# Patient Record
Sex: FEMALE | Race: WHITE | NOT HISPANIC OR LATINO
[De-identification: names, ages, dates, MRNs, and addresses within clinical notes are randomized per-mention and may not be internally consistent; named-entity substitution may affect disease eponyms.]

---

## 2017-02-21 ENCOUNTER — ASOB RESULT (OUTPATIENT)
Age: 27
End: 2017-02-21

## 2017-02-21 ENCOUNTER — APPOINTMENT (OUTPATIENT)
Dept: ANTEPARTUM | Facility: CLINIC | Age: 27
End: 2017-02-21

## 2017-07-04 ENCOUNTER — OUTPATIENT (OUTPATIENT)
Dept: OUTPATIENT SERVICES | Facility: HOSPITAL | Age: 27
LOS: 1 days | End: 2017-07-04
Payer: COMMERCIAL

## 2017-07-04 DIAGNOSIS — Z3A.00 WEEKS OF GESTATION OF PREGNANCY NOT SPECIFIED: ICD-10-CM

## 2017-07-04 DIAGNOSIS — O26.899 OTHER SPECIFIED PREGNANCY RELATED CONDITIONS, UNSPECIFIED TRIMESTER: ICD-10-CM

## 2017-07-04 PROCEDURE — G0463: CPT

## 2017-07-04 PROCEDURE — 59025 FETAL NON-STRESS TEST: CPT | Mod: 26

## 2017-07-04 PROCEDURE — 59025 FETAL NON-STRESS TEST: CPT

## 2017-07-05 ENCOUNTER — RESULT REVIEW (OUTPATIENT)
Age: 27
End: 2017-07-05

## 2017-07-05 ENCOUNTER — INPATIENT (INPATIENT)
Facility: HOSPITAL | Age: 27
LOS: 3 days | Discharge: HOME | End: 2017-07-09
Attending: OBSTETRICS & GYNECOLOGY | Admitting: OBSTETRICS & GYNECOLOGY
Payer: COMMERCIAL

## 2017-07-05 VITALS — HEIGHT: 63 IN | WEIGHT: 158.73 LBS

## 2017-07-05 DIAGNOSIS — O26.899 OTHER SPECIFIED PREGNANCY RELATED CONDITIONS, UNSPECIFIED TRIMESTER: ICD-10-CM

## 2017-07-05 DIAGNOSIS — Z3A.00 WEEKS OF GESTATION OF PREGNANCY NOT SPECIFIED: ICD-10-CM

## 2017-07-05 DIAGNOSIS — Z34.80 ENCOUNTER FOR SUPERVISION OF OTHER NORMAL PREGNANCY, UNSPECIFIED TRIMESTER: ICD-10-CM

## 2017-07-05 LAB
AMNISURE ROM (RUPTURE OF MEMBRANES): POSITIVE
BASOPHILS # BLD AUTO: 0 K/UL — SIGNIFICANT CHANGE UP (ref 0–0.2)
BASOPHILS NFR BLD AUTO: 0.3 % — SIGNIFICANT CHANGE UP (ref 0–2)
BLD GP AB SCN SERPL QL: NEGATIVE — SIGNIFICANT CHANGE UP
EOSINOPHIL # BLD AUTO: 0.1 K/UL — SIGNIFICANT CHANGE UP (ref 0–0.5)
EOSINOPHIL NFR BLD AUTO: 0.8 % — SIGNIFICANT CHANGE UP (ref 0–6)
HCT VFR BLD CALC: 38.4 % — SIGNIFICANT CHANGE UP (ref 34.5–45)
HGB BLD-MCNC: 13.3 G/DL — SIGNIFICANT CHANGE UP (ref 11.5–15.5)
LYMPHOCYTES # BLD AUTO: 2.6 K/UL — SIGNIFICANT CHANGE UP (ref 1–3.3)
LYMPHOCYTES # BLD AUTO: 23.1 % — SIGNIFICANT CHANGE UP (ref 13–44)
MCHC RBC-ENTMCNC: 31.6 PG — SIGNIFICANT CHANGE UP (ref 27–34)
MCHC RBC-ENTMCNC: 34.8 GM/DL — SIGNIFICANT CHANGE UP (ref 32–36)
MCV RBC AUTO: 90.8 FL — SIGNIFICANT CHANGE UP (ref 80–100)
MONOCYTES # BLD AUTO: 0.6 K/UL — SIGNIFICANT CHANGE UP (ref 0–0.9)
MONOCYTES NFR BLD AUTO: 5.5 % — SIGNIFICANT CHANGE UP (ref 2–14)
NEUTROPHILS # BLD AUTO: 7.8 K/UL — HIGH (ref 1.8–7.4)
NEUTROPHILS NFR BLD AUTO: 70.3 % — SIGNIFICANT CHANGE UP (ref 43–77)
PLATELET # BLD AUTO: 308 K/UL — SIGNIFICANT CHANGE UP (ref 150–400)
RBC # BLD: 4.23 M/UL — SIGNIFICANT CHANGE UP (ref 3.8–5.2)
RBC # FLD: 12.5 % — SIGNIFICANT CHANGE UP (ref 10.3–14.5)
RH IG SCN BLD-IMP: POSITIVE — SIGNIFICANT CHANGE UP
RH IG SCN BLD-IMP: POSITIVE — SIGNIFICANT CHANGE UP
WBC # BLD: 11 K/UL — HIGH (ref 3.8–10.5)
WBC # FLD AUTO: 11 K/UL — HIGH (ref 3.8–10.5)

## 2017-07-05 RX ORDER — OXYTOCIN 10 UNIT/ML
4 VIAL (ML) INJECTION
Qty: 30 | Refills: 0 | Status: DISCONTINUED | OUTPATIENT
Start: 2017-07-05 | End: 2017-07-05

## 2017-07-05 RX ORDER — SODIUM CHLORIDE 9 MG/ML
1000 INJECTION, SOLUTION INTRAVENOUS ONCE
Qty: 0 | Refills: 0 | Status: COMPLETED | OUTPATIENT
Start: 2017-07-05 | End: 2017-07-05

## 2017-07-05 RX ORDER — CITRIC ACID/SODIUM CITRATE 300-500 MG
15 SOLUTION, ORAL ORAL EVERY 4 HOURS
Qty: 0 | Refills: 0 | Status: DISCONTINUED | OUTPATIENT
Start: 2017-07-05 | End: 2017-07-06

## 2017-07-05 RX ORDER — OXYTOCIN 10 UNIT/ML
4 VIAL (ML) INJECTION
Qty: 30 | Refills: 0 | Status: DISCONTINUED | OUTPATIENT
Start: 2017-07-05 | End: 2017-07-09

## 2017-07-05 RX ORDER — SODIUM CHLORIDE 9 MG/ML
1000 INJECTION, SOLUTION INTRAVENOUS
Qty: 0 | Refills: 0 | Status: DISCONTINUED | OUTPATIENT
Start: 2017-07-05 | End: 2017-07-06

## 2017-07-05 RX ORDER — OXYTOCIN 10 UNIT/ML
333.33 VIAL (ML) INJECTION
Qty: 20 | Refills: 0 | Status: DISCONTINUED | OUTPATIENT
Start: 2017-07-05 | End: 2017-07-06

## 2017-07-05 RX ADMIN — Medication 15 MILLILITER(S): at 18:38

## 2017-07-05 RX ADMIN — Medication 4 MILLIUNIT(S)/MIN: at 18:34

## 2017-07-05 RX ADMIN — SODIUM CHLORIDE 125 MILLILITER(S): 9 INJECTION, SOLUTION INTRAVENOUS at 18:34

## 2017-07-05 RX ADMIN — SODIUM CHLORIDE 2000 MILLILITER(S): 9 INJECTION, SOLUTION INTRAVENOUS at 17:15

## 2017-07-06 ENCOUNTER — TRANSCRIPTION ENCOUNTER (OUTPATIENT)
Age: 27
End: 2017-07-06

## 2017-07-06 LAB — T PALLIDUM AB TITR SER: NEGATIVE — SIGNIFICANT CHANGE UP

## 2017-07-06 PROCEDURE — 88307 TISSUE EXAM BY PATHOLOGIST: CPT | Mod: 26

## 2017-07-06 RX ORDER — ONDANSETRON 8 MG/1
4 TABLET, FILM COATED ORAL EVERY 6 HOURS
Qty: 0 | Refills: 0 | Status: DISCONTINUED | OUTPATIENT
Start: 2017-07-06 | End: 2017-07-07

## 2017-07-06 RX ORDER — SODIUM CHLORIDE 9 MG/ML
1000 INJECTION, SOLUTION INTRAVENOUS
Qty: 0 | Refills: 0 | Status: DISCONTINUED | OUTPATIENT
Start: 2017-07-06 | End: 2017-07-09

## 2017-07-06 RX ORDER — DOCUSATE SODIUM 100 MG
100 CAPSULE ORAL
Qty: 0 | Refills: 0 | Status: DISCONTINUED | OUTPATIENT
Start: 2017-07-06 | End: 2017-07-09

## 2017-07-06 RX ORDER — DIPHENHYDRAMINE HCL 50 MG
25 CAPSULE ORAL EVERY 6 HOURS
Qty: 0 | Refills: 0 | Status: DISCONTINUED | OUTPATIENT
Start: 2017-07-06 | End: 2017-07-09

## 2017-07-06 RX ORDER — KETOROLAC TROMETHAMINE 30 MG/ML
30 SYRINGE (ML) INJECTION EVERY 6 HOURS
Qty: 0 | Refills: 0 | Status: DISCONTINUED | OUTPATIENT
Start: 2017-07-06 | End: 2017-07-09

## 2017-07-06 RX ORDER — DIPHENHYDRAMINE HCL 50 MG
25 CAPSULE ORAL EVERY 4 HOURS
Qty: 0 | Refills: 0 | Status: DISCONTINUED | OUTPATIENT
Start: 2017-07-06 | End: 2017-07-07

## 2017-07-06 RX ORDER — FENTANYL CITRATE 50 UG/ML
250 INJECTION INTRAVENOUS
Qty: 0 | Refills: 0 | Status: DISCONTINUED | OUTPATIENT
Start: 2017-07-06 | End: 2017-07-07

## 2017-07-06 RX ORDER — OXYCODONE HYDROCHLORIDE 5 MG/1
5 TABLET ORAL EVERY 4 HOURS
Qty: 0 | Refills: 0 | Status: DISCONTINUED | OUTPATIENT
Start: 2017-07-06 | End: 2017-07-09

## 2017-07-06 RX ORDER — FAMOTIDINE 10 MG/ML
20 INJECTION INTRAVENOUS ONCE
Qty: 0 | Refills: 0 | Status: DISCONTINUED | OUTPATIENT
Start: 2017-07-06 | End: 2017-07-09

## 2017-07-06 RX ORDER — OXYTOCIN 10 UNIT/ML
41.67 VIAL (ML) INJECTION
Qty: 20 | Refills: 0 | Status: DISCONTINUED | OUTPATIENT
Start: 2017-07-06 | End: 2017-07-06

## 2017-07-06 RX ORDER — OXYCODONE HYDROCHLORIDE 5 MG/1
5 TABLET ORAL
Qty: 0 | Refills: 0 | Status: DISCONTINUED | OUTPATIENT
Start: 2017-07-06 | End: 2017-07-09

## 2017-07-06 RX ORDER — IBUPROFEN 200 MG
600 TABLET ORAL EVERY 6 HOURS
Qty: 0 | Refills: 0 | Status: COMPLETED | OUTPATIENT
Start: 2017-07-06 | End: 2018-06-04

## 2017-07-06 RX ORDER — TETANUS TOXOID, REDUCED DIPHTHERIA TOXOID AND ACELLULAR PERTUSSIS VACCINE, ADSORBED 5; 2.5; 8; 8; 2.5 [IU]/.5ML; [IU]/.5ML; UG/.5ML; UG/.5ML; UG/.5ML
0.5 SUSPENSION INTRAMUSCULAR ONCE
Qty: 0 | Refills: 0 | Status: DISCONTINUED | OUTPATIENT
Start: 2017-07-06 | End: 2017-07-09

## 2017-07-06 RX ORDER — HEPARIN SODIUM 5000 [USP'U]/ML
5000 INJECTION INTRAVENOUS; SUBCUTANEOUS EVERY 12 HOURS
Qty: 0 | Refills: 0 | Status: DISCONTINUED | OUTPATIENT
Start: 2017-07-06 | End: 2017-07-09

## 2017-07-06 RX ORDER — SODIUM CHLORIDE 9 MG/ML
1000 INJECTION INTRAMUSCULAR; INTRAVENOUS; SUBCUTANEOUS
Qty: 0 | Refills: 0 | Status: DISCONTINUED | OUTPATIENT
Start: 2017-07-06 | End: 2017-07-09

## 2017-07-06 RX ORDER — SIMETHICONE 80 MG/1
80 TABLET, CHEWABLE ORAL EVERY 4 HOURS
Qty: 0 | Refills: 0 | Status: DISCONTINUED | OUTPATIENT
Start: 2017-07-06 | End: 2017-07-09

## 2017-07-06 RX ORDER — CITRIC ACID/SODIUM CITRATE 300-500 MG
15 SOLUTION, ORAL ORAL ONCE
Qty: 0 | Refills: 0 | Status: DISCONTINUED | OUTPATIENT
Start: 2017-07-06 | End: 2017-07-09

## 2017-07-06 RX ORDER — FERROUS SULFATE 325(65) MG
325 TABLET ORAL DAILY
Qty: 0 | Refills: 0 | Status: DISCONTINUED | OUTPATIENT
Start: 2017-07-06 | End: 2017-07-09

## 2017-07-06 RX ORDER — LANOLIN
1 OINTMENT (GRAM) TOPICAL
Qty: 0 | Refills: 0 | Status: DISCONTINUED | OUTPATIENT
Start: 2017-07-06 | End: 2017-07-09

## 2017-07-06 RX ORDER — CEFAZOLIN SODIUM 1 G
2000 VIAL (EA) INJECTION EVERY 8 HOURS
Qty: 0 | Refills: 0 | Status: DISCONTINUED | OUTPATIENT
Start: 2017-07-06 | End: 2017-07-09

## 2017-07-06 RX ORDER — SODIUM CHLORIDE 9 MG/ML
1000 INJECTION, SOLUTION INTRAVENOUS
Qty: 0 | Refills: 0 | Status: DISCONTINUED | OUTPATIENT
Start: 2017-07-06 | End: 2017-07-06

## 2017-07-06 RX ORDER — SODIUM CHLORIDE 9 MG/ML
500 INJECTION INTRAMUSCULAR; INTRAVENOUS; SUBCUTANEOUS ONCE
Qty: 0 | Refills: 0 | Status: COMPLETED | OUTPATIENT
Start: 2017-07-06 | End: 2017-07-06

## 2017-07-06 RX ORDER — DEXAMETHASONE 0.5 MG/5ML
4 ELIXIR ORAL EVERY 6 HOURS
Qty: 0 | Refills: 0 | Status: DISCONTINUED | OUTPATIENT
Start: 2017-07-06 | End: 2017-07-07

## 2017-07-06 RX ORDER — NALOXONE HYDROCHLORIDE 4 MG/.1ML
0.1 SPRAY NASAL
Qty: 0 | Refills: 0 | Status: DISCONTINUED | OUTPATIENT
Start: 2017-07-06 | End: 2017-07-07

## 2017-07-06 RX ORDER — METOCLOPRAMIDE HCL 10 MG
10 TABLET ORAL ONCE
Qty: 0 | Refills: 0 | Status: DISCONTINUED | OUTPATIENT
Start: 2017-07-06 | End: 2017-07-09

## 2017-07-06 RX ORDER — OXYTOCIN 10 UNIT/ML
333.33 VIAL (ML) INJECTION
Qty: 20 | Refills: 0 | Status: DISCONTINUED | OUTPATIENT
Start: 2017-07-06 | End: 2017-07-06

## 2017-07-06 RX ORDER — GLYCERIN ADULT
1 SUPPOSITORY, RECTAL RECTAL AT BEDTIME
Qty: 0 | Refills: 0 | Status: DISCONTINUED | OUTPATIENT
Start: 2017-07-06 | End: 2017-07-09

## 2017-07-06 RX ORDER — ACETAMINOPHEN 500 MG
975 TABLET ORAL EVERY 6 HOURS
Qty: 0 | Refills: 0 | Status: COMPLETED | OUTPATIENT
Start: 2017-07-06 | End: 2018-06-04

## 2017-07-06 RX ADMIN — FENTANYL CITRATE 250 MILLILITER(S): 50 INJECTION INTRAVENOUS at 04:51

## 2017-07-06 RX ADMIN — SODIUM CHLORIDE 1000 MILLILITER(S): 9 INJECTION INTRAMUSCULAR; INTRAVENOUS; SUBCUTANEOUS at 02:38

## 2017-07-06 RX ADMIN — Medication 100 MILLIGRAM(S): at 18:21

## 2017-07-06 RX ADMIN — Medication 30 MILLIGRAM(S): at 17:41

## 2017-07-06 RX ADMIN — Medication 30 MILLIGRAM(S): at 04:57

## 2017-07-06 RX ADMIN — Medication 30 MILLIGRAM(S): at 06:00

## 2017-07-06 RX ADMIN — Medication 30 MILLIGRAM(S): at 18:23

## 2017-07-06 RX ADMIN — HEPARIN SODIUM 5000 UNIT(S): 5000 INJECTION INTRAVENOUS; SUBCUTANEOUS at 17:41

## 2017-07-07 LAB
BASOPHILS # BLD AUTO: 0 K/UL — SIGNIFICANT CHANGE UP (ref 0–0.2)
BASOPHILS NFR BLD AUTO: 0.4 % — SIGNIFICANT CHANGE UP (ref 0–2)
EOSINOPHIL # BLD AUTO: 0.1 K/UL — SIGNIFICANT CHANGE UP (ref 0–0.5)
EOSINOPHIL NFR BLD AUTO: 1.2 % — SIGNIFICANT CHANGE UP (ref 0–6)
HCT VFR BLD CALC: 32.2 % — LOW (ref 34.5–45)
HGB BLD-MCNC: 10.6 G/DL — LOW (ref 11.5–15.5)
LYMPHOCYTES # BLD AUTO: 2.4 K/UL — SIGNIFICANT CHANGE UP (ref 1–3.3)
LYMPHOCYTES # BLD AUTO: 20.1 % — SIGNIFICANT CHANGE UP (ref 13–44)
MCHC RBC-ENTMCNC: 30.3 PG — SIGNIFICANT CHANGE UP (ref 27–34)
MCHC RBC-ENTMCNC: 32.8 GM/DL — SIGNIFICANT CHANGE UP (ref 32–36)
MCV RBC AUTO: 92.4 FL — SIGNIFICANT CHANGE UP (ref 80–100)
MONOCYTES # BLD AUTO: 0.6 K/UL — SIGNIFICANT CHANGE UP (ref 0–0.9)
MONOCYTES NFR BLD AUTO: 5.1 % — SIGNIFICANT CHANGE UP (ref 2–14)
NEUTROPHILS # BLD AUTO: 8.6 K/UL — HIGH (ref 1.8–7.4)
NEUTROPHILS NFR BLD AUTO: 73.2 % — SIGNIFICANT CHANGE UP (ref 43–77)
PLATELET # BLD AUTO: 226 K/UL — SIGNIFICANT CHANGE UP (ref 150–400)
RBC # BLD: 3.48 M/UL — LOW (ref 3.8–5.2)
RBC # FLD: 12.5 % — SIGNIFICANT CHANGE UP (ref 10.3–14.5)
WBC # BLD: 11.8 K/UL — HIGH (ref 3.8–10.5)
WBC # FLD AUTO: 11.8 K/UL — HIGH (ref 3.8–10.5)

## 2017-07-07 RX ORDER — LEVOTHYROXINE SODIUM 125 MCG
75 TABLET ORAL DAILY
Qty: 0 | Refills: 0 | Status: DISCONTINUED | OUTPATIENT
Start: 2017-07-07 | End: 2017-07-09

## 2017-07-07 RX ADMIN — Medication 30 MILLIGRAM(S): at 11:21

## 2017-07-07 RX ADMIN — Medication 100 MILLIGRAM(S): at 03:30

## 2017-07-07 RX ADMIN — Medication 30 MILLIGRAM(S): at 17:12

## 2017-07-07 RX ADMIN — Medication 30 MILLIGRAM(S): at 06:30

## 2017-07-07 RX ADMIN — Medication 30 MILLIGRAM(S): at 23:53

## 2017-07-07 RX ADMIN — Medication 30 MILLIGRAM(S): at 06:08

## 2017-07-07 RX ADMIN — Medication 75 MICROGRAM(S): at 06:07

## 2017-07-07 RX ADMIN — HEPARIN SODIUM 5000 UNIT(S): 5000 INJECTION INTRAVENOUS; SUBCUTANEOUS at 06:08

## 2017-07-07 RX ADMIN — Medication 30 MILLIGRAM(S): at 01:00

## 2017-07-07 RX ADMIN — Medication 30 MILLIGRAM(S): at 22:53

## 2017-07-07 RX ADMIN — Medication 30 MILLIGRAM(S): at 12:00

## 2017-07-07 RX ADMIN — HEPARIN SODIUM 5000 UNIT(S): 5000 INJECTION INTRAVENOUS; SUBCUTANEOUS at 17:12

## 2017-07-07 RX ADMIN — Medication 30 MILLIGRAM(S): at 00:23

## 2017-07-07 RX ADMIN — Medication 30 MILLIGRAM(S): at 11:30

## 2017-07-07 RX ADMIN — Medication 1 TABLET(S): at 17:12

## 2017-07-07 NOTE — PROGRESS NOTE ADULT - SUBJECTIVE AND OBJECTIVE BOX
Day 1 of Anesthesia Pain Management Service    SUBJECTIVE: I'm okay  Pain Scale Score	At rest: ___ 	With Activity: ___ 	[  x ] Refer to charted pain scores    THERAPY:  [ ] Epidural Bupivacaine 0.0625% and Hydromorphone 10 micrograms/mL  [ ] Epidural Ropivacaine 0.2% plain   [ x ] Epidural Bupivacaine 0.01 % and Fentanyl 3 micrograms/mL  (OB)    Demand dose 3 mL  Lockout  15 minutes   Continuous Rate 10mL    MEDICATIONS  (STANDING):  oxytocin Infusion 4 milliUNIT(s)/Min (4 mL/Hr) IV Continuous <Continuous>  sodium chloride 0.9%. 1000 milliLiter(s) (125 mL/Hr) IV Continuous <Continuous>  famotidine Injectable 20 milliGRAM(s) IV Push once  metoclopramide Injectable 10 milliGRAM(s) IV Push once  citric acid/sodium citrate Solution 15 milliLiter(s) Oral once  fentaNYL (3 MICROgram(s)/mL) + BUpivacaine 0.01% in 0.9% Sodium Chloride PCEA 250 milliLiter(s) Epidural PCA Continuous  ketorolac   Injectable 30 milliGRAM(s) IV Push every 6 hours  oxyCODONE    IR 5 milliGRAM(s) Oral every 3 hours  ibuprofen  Tablet 600 milliGRAM(s) Oral every 6 hours  acetaminophen   Tablet. 975 milliGRAM(s) Oral every 6 hours  heparin  Injectable 5000 Unit(s) SubCutaneous every 12 hours  lactated ringers. 1000 milliLiter(s) (125 mL/Hr) IV Continuous <Continuous>  diphtheria/tetanus/pertussis (acellular) Vaccine (ADAcel) 0.5 milliLiter(s) IntraMuscular once  ferrous    sulfate 325 milliGRAM(s) Oral daily  prenatal multivitamin 1 Tablet(s) Oral daily  ceFAZolin   IVPB 2000 milliGRAM(s) IV Intermittent every 8 hours  levothyroxine 75 MICROGram(s) Oral daily    MEDICATIONS  (PRN):  fentaNYL (3 MICROgram(s)/mL) + BUpivacaine 0.01% in 0.9% Sodium Chloride PCEA Rescue [facility] Bolus 5 milliLiter(s) Epidural every 15 minutes PRN Moderate Pain (4 - 6)  naloxone Injectable 0.1 milliGRAM(s) IV Push every 3 minutes PRN For ANY of the following changes in patient status:  A. RR LESS THAN 10 breaths per minute, B. Oxygen saturation LESS THAN 90%, C. Sedation score of 6  ondansetron Injectable 4 milliGRAM(s) IV Push every 6 hours PRN Nausea  dexamethasone  Injectable 4 milliGRAM(s) IV Push every 6 hours PRN Nausea, IF ondansetron is ineffective after 30 - 60 minutes  diphenhydrAMINE   Capsule 25 milliGRAM(s) Oral every 4 hours PRN Pruritus  oxyCODONE    IR 5 milliGRAM(s) Oral every 4 hours PRN Severe Pain (7 - 10)  simethicone 80 milliGRAM(s) Chew every 4 hours PRN Gas  diphenhydrAMINE   Capsule 25 milliGRAM(s) Oral every 6 hours PRN Itching  glycerin Suppository - Adult 1 Suppository(s) Rectal at bedtime PRN Constipation  docusate sodium 100 milliGRAM(s) Oral two times a day PRN Stool Softening  lanolin Ointment 1 Application(s) Topical every 3 hours PRN Sore Nipples      OBJECTIVE:    Assessment of Epidural Catheter Site: 	    [  ] Dressing intact	[x ] Site non-tender	[x ] Site without erythema, discharge, edema  [  ] Epidural tubing and connection checked	[ x] Gross neurological exam within normal limits  [ ] Catheter removed – tip intact		                          10.6   11.8  )-----------( 226      ( 07 Jul 2017 07:05 )             32.2     Vital Signs Last 24 Hrs  T(C): 36.4 (07-07-17 @ 09:11), Max: 36.8 (07-07-17 @ 00:53)  T(F): 97.5 (07-07-17 @ 09:11), Max: 98.2 (07-07-17 @ 00:53)  HR: 80 (07-07-17 @ 09:11) (69 - 80)  BP: 119/76 (07-07-17 @ 09:11) (105/70 - 119/76)  BP(mean): --  RR: 18 (07-07-17 @ 09:11) (16 - 18)  SpO2: 99% (07-07-17 @ 09:11) (97% - 99%)      Sedation Score:	[x ] Alert	[ ] Drowsy	[ ] Arousable  [ ] Asleep  [ ] Unresponsive    Side Effects:	[ x] None	[ ] Nausea	[ ] Vomiting  [ ] Pruritus  		[ ] Weakness  [ ] Numbness  [ ] Other:    ASSESSMENT/ PLAN:    Therapy to  be:	[  ] Continue   [ x] Discontinued   [x ] Change to prn Analgesics    Documentation and Verification of current medications:  [ X ] Done	[ ] Not done, not eligible, reason:    Comments: Epidural dislodge.

## 2017-07-07 NOTE — PROGRESS NOTE ADULT - SUBJECTIVE AND OBJECTIVE BOX
Vital Signs Last 24 Hrs  T(C): 36.6 (07 Jul 2017 05:00), Max: 36.8 (07 Jul 2017 00:53)  T(F): 97.9 (07 Jul 2017 05:00), Max: 98.2 (07 Jul 2017 00:53)  HR: 69 (07 Jul 2017 05:00) (66 - 80)  BP: 106/70 (07 Jul 2017 05:00) (105/70 - 141/86)  BP(mean): 109 (06 Jul 2017 06:35) (109 - 109)  RR: 18 (07 Jul 2017 05:00) (16 - 23)  SpO2: 97% (07 Jul 2017 05:00) (97% - 99%)    Abdomen soft  Fundus Firm  Incision clean, dry and intact  Lochia WNL  voiding  stable

## 2017-07-07 NOTE — PROGRESS NOTE ADULT - SUBJECTIVE AND OBJECTIVE BOX
Pain Management Attending Addendum    SUBJECTIVE: Patient comfortable    Therapy:	  [ ] IV PCA	   [x ] Epidural           [ ] s/p Spinal Opoid              [ ] Postpartum infusion	  [ ] Patient controlled regional anesthesia (PCRA)    [ ] prn Analgesics    OBJECTIVE:   [x ] No new signs     [ ] Other:    Side Effects:  [x ] None			[ ] Other:    Assessment of Catheter Site:		[ ] Intact		[ ] Other:    ASSESSMENT/PLAN  [ ] Continue current therapy    [ ] Therapy changed to:    [ ] IV PCA       [ ] Epidural     [x ] prn Analgesics     [ ] post partum infusion    Comments:

## 2017-07-07 NOTE — PROGRESS NOTE ADULT - SUBJECTIVE AND OBJECTIVE BOX
Postpartum Note,  Section   26y year old woman post-operative day 1 from uncomplicated primary LTCS.  No acute events overnight.  Patient has no complaints and pain is well-controlled.  Patient is tolerating regular diet, passing flatus, ambulating, voiding spontaneous, is due to void, has a rl catheter in place.  Postpartum bleeding is well controlled.    Physical exam:    Vital Signs Last 24 Hrs  T(C): 36.6 (2017 05:00), Max: 36.8 (2017 00:53)  T(F): 97.9 (2017 05:00), Max: 98.2 (2017 00:53)  HR: 69 (2017 05:00) (69 - 80)  BP: 106/70 (2017 05:00) (105/70 - 112/79)  BP(mean): --  RR: 18 (2017 05:00) (16 - 18)  SpO2: 97% (2017 05:00) (97% - 99%)    07-06 @ 07:01  -  07-07 @ 07:00  --------------------------------------------------------  IN: 875 mL / OUT: 3100 mL / NET: -2225 mL        Gen: NAD  Abdomen: Soft, nontender, no distension , firm uterine fundus at umbilicus.  Incision: Clean, dry, and intact with steri strips  Pelvic: Normal lochia noted  Ext: No calf tenderness    LABS:                        13.3   11.0  )-----------( 308      ( 2017 17:22 )             38.4                 q      Assessment and Plan

## 2017-07-07 NOTE — PROGRESS NOTE ADULT - PROBLEM SELECTOR PLAN 1
-Encourage Ambulation  -Continue with regular diet  -Heparin, SCDs, and ambulation for DVT ppx  -Discontinue lr  -Check CBC  -Analgesia as needed

## 2017-07-08 RX ORDER — ACETAMINOPHEN 500 MG
975 TABLET ORAL EVERY 6 HOURS
Qty: 0 | Refills: 0 | Status: DISCONTINUED | OUTPATIENT
Start: 2017-07-08 | End: 2017-07-09

## 2017-07-08 RX ORDER — IBUPROFEN 200 MG
600 TABLET ORAL EVERY 6 HOURS
Qty: 0 | Refills: 0 | Status: DISCONTINUED | OUTPATIENT
Start: 2017-07-08 | End: 2017-07-09

## 2017-07-08 RX ORDER — SODIUM CHLORIDE 0.65 %
1 AEROSOL, SPRAY (ML) NASAL THREE TIMES A DAY
Qty: 0 | Refills: 0 | Status: DISCONTINUED | OUTPATIENT
Start: 2017-07-08 | End: 2017-07-09

## 2017-07-08 RX ADMIN — Medication 600 MILLIGRAM(S): at 06:27

## 2017-07-08 RX ADMIN — Medication 600 MILLIGRAM(S): at 19:45

## 2017-07-08 RX ADMIN — Medication 600 MILLIGRAM(S): at 05:33

## 2017-07-08 RX ADMIN — Medication 600 MILLIGRAM(S): at 19:15

## 2017-07-08 RX ADMIN — HEPARIN SODIUM 5000 UNIT(S): 5000 INJECTION INTRAVENOUS; SUBCUTANEOUS at 19:15

## 2017-07-08 RX ADMIN — Medication 1 TABLET(S): at 13:22

## 2017-07-08 RX ADMIN — Medication 600 MILLIGRAM(S): at 13:22

## 2017-07-08 RX ADMIN — Medication 600 MILLIGRAM(S): at 13:50

## 2017-07-08 RX ADMIN — HEPARIN SODIUM 5000 UNIT(S): 5000 INJECTION INTRAVENOUS; SUBCUTANEOUS at 06:25

## 2017-07-08 RX ADMIN — Medication 75 MICROGRAM(S): at 06:25

## 2017-07-08 NOTE — PROGRESS NOTE ADULT - PROBLEM SELECTOR PLAN 1
-Encourage Ambulation  -Continue with regular diet  -Heparin, SCDs, and ambulation for DVT ppx  -Analgesia as needed

## 2017-07-08 NOTE — PROGRESS NOTE ADULT - SUBJECTIVE AND OBJECTIVE BOX
Postpartum Note,  Section   26y year old woman post-operative day 2 3 from uncomplicated primary LTCS.  No acute events overnight.  Patient has no complaints and pain is well-controlled.  Patient is tolerating regular diet, passing flatus, ambulating, and is voiding spontaneously.  Postpartum bleeding is well controlled.    Physical exam:    Vital Signs Last 24 Hrs  T(C): 36.8 (2017 07:01), Max: 37 (2017 21:36)  T(F): 98.2 (2017 07:01), Max: 98.6 (2017 21:36)  HR: 78 (2017 07:01) (63 - 80)  BP: 112/76 (2017 07:01) (106/72 - 119/76)  BP(mean): --  RR: 18 (2017 07:01) (18 - 18)  SpO2: 98% (2017 17:00) (98% - 99%)    Gen: NAD  Abdomen: Soft, nontender, no distension , firm uterine fundus at umbilicus.  Incision: Clean, dry, and intact with steri strips  Pelvic: Normal lochia noted  Ext: No calf tenderness    LABS:                        10.6   11.8  )-----------( 226      ( 2017 07:05 )             32.2

## 2017-07-09 ENCOUNTER — TRANSCRIPTION ENCOUNTER (OUTPATIENT)
Age: 27
End: 2017-07-09

## 2017-07-09 VITALS
SYSTOLIC BLOOD PRESSURE: 115 MMHG | HEART RATE: 67 BPM | TEMPERATURE: 37 F | RESPIRATION RATE: 18 BRPM | DIASTOLIC BLOOD PRESSURE: 76 MMHG | OXYGEN SATURATION: 97 %

## 2017-07-09 LAB
BASOPHILS # BLD AUTO: 0 K/UL — SIGNIFICANT CHANGE UP (ref 0–0.2)
BASOPHILS NFR BLD AUTO: 0.5 % — SIGNIFICANT CHANGE UP (ref 0–2)
EOSINOPHIL # BLD AUTO: 0.4 K/UL — SIGNIFICANT CHANGE UP (ref 0–0.5)
EOSINOPHIL NFR BLD AUTO: 4 % — SIGNIFICANT CHANGE UP (ref 0–6)
HCT VFR BLD CALC: 31.7 % — LOW (ref 34.5–45)
HGB BLD-MCNC: 10.7 G/DL — LOW (ref 11.5–15.5)
LYMPHOCYTES # BLD AUTO: 2.4 K/UL — SIGNIFICANT CHANGE UP (ref 1–3.3)
LYMPHOCYTES # BLD AUTO: 24.5 % — SIGNIFICANT CHANGE UP (ref 13–44)
MCHC RBC-ENTMCNC: 31.3 PG — SIGNIFICANT CHANGE UP (ref 27–34)
MCHC RBC-ENTMCNC: 33.9 GM/DL — SIGNIFICANT CHANGE UP (ref 32–36)
MCV RBC AUTO: 92.4 FL — SIGNIFICANT CHANGE UP (ref 80–100)
MONOCYTES # BLD AUTO: 0.4 K/UL — SIGNIFICANT CHANGE UP (ref 0–0.9)
MONOCYTES NFR BLD AUTO: 3.7 % — SIGNIFICANT CHANGE UP (ref 2–14)
NEUTROPHILS # BLD AUTO: 6.6 K/UL — SIGNIFICANT CHANGE UP (ref 1.8–7.4)
NEUTROPHILS NFR BLD AUTO: 67.3 % — SIGNIFICANT CHANGE UP (ref 43–77)
PLATELET # BLD AUTO: 269 K/UL — SIGNIFICANT CHANGE UP (ref 150–400)
RBC # BLD: 3.43 M/UL — LOW (ref 3.8–5.2)
RBC # FLD: 12.3 % — SIGNIFICANT CHANGE UP (ref 10.3–14.5)
WBC # BLD: 9.8 K/UL — SIGNIFICANT CHANGE UP (ref 3.8–10.5)
WBC # FLD AUTO: 9.8 K/UL — SIGNIFICANT CHANGE UP (ref 3.8–10.5)

## 2017-07-09 PROCEDURE — 86850 RBC ANTIBODY SCREEN: CPT

## 2017-07-09 PROCEDURE — 86780 TREPONEMA PALLIDUM: CPT

## 2017-07-09 PROCEDURE — 84112 EVAL AMNIOTIC FLUID PROTEIN: CPT

## 2017-07-09 PROCEDURE — 59025 FETAL NON-STRESS TEST: CPT

## 2017-07-09 PROCEDURE — 86900 BLOOD TYPING SEROLOGIC ABO: CPT

## 2017-07-09 PROCEDURE — 59050 FETAL MONITOR W/REPORT: CPT

## 2017-07-09 PROCEDURE — 85027 COMPLETE CBC AUTOMATED: CPT

## 2017-07-09 PROCEDURE — G0463: CPT

## 2017-07-09 PROCEDURE — 86901 BLOOD TYPING SEROLOGIC RH(D): CPT

## 2017-07-09 PROCEDURE — 88307 TISSUE EXAM BY PATHOLOGIST: CPT

## 2017-07-09 RX ADMIN — Medication 600 MILLIGRAM(S): at 01:01

## 2017-07-09 RX ADMIN — Medication 600 MILLIGRAM(S): at 11:25

## 2017-07-09 RX ADMIN — HEPARIN SODIUM 5000 UNIT(S): 5000 INJECTION INTRAVENOUS; SUBCUTANEOUS at 06:39

## 2017-07-09 RX ADMIN — Medication 75 MICROGRAM(S): at 06:40

## 2017-07-09 RX ADMIN — Medication 600 MILLIGRAM(S): at 12:08

## 2017-07-09 RX ADMIN — Medication 600 MILLIGRAM(S): at 06:39

## 2017-07-09 RX ADMIN — Medication 600 MILLIGRAM(S): at 01:55

## 2017-07-09 NOTE — DISCHARGE NOTE OB - MATERIALS PROVIDED
Birth Certificate Instructions/Back To Sleep Handout/Breastfeeding Guide and Packet/Breastfeeding Log/Breastfeeding Mother’s Support Group Information/Guide to Postpartum Care/[facility] [place] Screening Program/[facility] Hearing Screen Program/Shaken Baby Prevention Handout

## 2017-07-09 NOTE — PROGRESS NOTE ADULT - SUBJECTIVE AND OBJECTIVE BOX
Vital Signs Last 24 Hrs  T(C): 2.7 (09 Jul 2017 09:12), Max: 36.8 (08 Jul 2017 18:40)  T(F): 36.8 (09 Jul 2017 09:12), Max: 98.2 (08 Jul 2017 18:40)  HR: 67 (09 Jul 2017 09:12) (66 - 85)  BP: 115/76 (09 Jul 2017 09:12) (107/72 - 120/84)  BP(mean): --  RR: 18 (09 Jul 2017 09:12) (18 - 18)  SpO2: 97% (09 Jul 2017 09:12) (97% - 97%)    Abdomen soft  Fundus Firm  Incision clean, dry and intact  Lochia WNL  voiding  stable

## 2017-07-09 NOTE — DISCHARGE NOTE OB - HOSPITAL COURSE
the patient was induced for SROM. during active labor there was a nonrerassuring fetal heart tracing and a c/s was urgently done. all went well.

## 2017-07-09 NOTE — PROGRESS NOTE ADULT - PROBLEM SELECTOR PLAN 1
- increase out of bed and ambulation  - analgesia prn  - PO pain meds w tylenol/motrin/oxycodone  - continue regular diet  - CBC wnl  - discharge planned for today    Judith Lemus, PGY1

## 2017-07-09 NOTE — DISCHARGE NOTE OB - PATIENT PORTAL LINK FT
“You can access the [employer] Patient Portal, offered by [facility], by registering with the following website: [email or web address]”

## 2017-07-09 NOTE — DISCHARGE NOTE OB - CARE PROVIDER_API CALL
Chela Wells), Obstetrics and Gynecology  [address]  [place] [address]  Phone: [phone]  Fax: [phone]

## 2017-07-09 NOTE — PROGRESS NOTE ADULT - SUBJECTIVE AND OBJECTIVE BOX
Postpartum Note,  Section  She is a  26y woman who is now post-operative day:     Subjective:  The patient feels well.  She is ambulating.   She is tolerating regular diet.  She denies nausea and vomiting.  She is voiding and having bowel movements.  Her pain is controlled.  She reports normal postpartum bleeding.  She is breastfeeding.      Physical exam:    Vital Signs Last 24 Hrs  T(C): 36.4 (2017 05:00), Max: 36.8 (2017 18:40)  T(F): 97.5 (2017 05:00), Max: 98.2 (2017 18:40)  HR: 69 (2017 05:00) (66 - 85)  BP: 120/84 (2017 05:00) (103/69 - 120/84)  BP(mean): --  RR: 18 (2017 05:00) (18 - 18)  SpO2: 97% (2017 09:21) (97% - 97%)    Gen: NAD  Breast: Soft, nontender, not engorged.  Abdomen: Soft, nontender, no distension , firm uterine fundus at umbilicus.  Incision: Clean, dry, and intact with steri strips  Pelvic: Normal lochia noted  Ext: No calf tenderness    LABS:                        10.7   9.8   )-----------( 269      ( 2017 06:44 )             31.7           Allergies    No Known Allergies    Intolerances      MEDICATIONS  (STANDING):  oxytocin Infusion 4 milliUNIT(s)/Min (4 mL/Hr) IV Continuous <Continuous>  sodium chloride 0.9%. 1000 milliLiter(s) (125 mL/Hr) IV Continuous <Continuous>  famotidine Injectable 20 milliGRAM(s) IV Push once  metoclopramide Injectable 10 milliGRAM(s) IV Push once  citric acid/sodium citrate Solution 15 milliLiter(s) Oral once  ketorolac   Injectable 30 milliGRAM(s) IV Push every 6 hours  oxyCODONE    IR 5 milliGRAM(s) Oral every 3 hours  heparin  Injectable 5000 Unit(s) SubCutaneous every 12 hours  lactated ringers. 1000 milliLiter(s) (125 mL/Hr) IV Continuous <Continuous>  diphtheria/tetanus/pertussis (acellular) Vaccine (ADAcel) 0.5 milliLiter(s) IntraMuscular once  ferrous    sulfate 325 milliGRAM(s) Oral daily  prenatal multivitamin 1 Tablet(s) Oral daily  ceFAZolin   IVPB 2000 milliGRAM(s) IV Intermittent every 8 hours  levothyroxine 75 MICROGram(s) Oral daily  ibuprofen  Tablet 600 milliGRAM(s) Oral every 6 hours  acetaminophen   Tablet. 975 milliGRAM(s) Oral every 6 hours    MEDICATIONS  (PRN):  oxyCODONE    IR 5 milliGRAM(s) Oral every 4 hours PRN Severe Pain (7 - 10)  simethicone 80 milliGRAM(s) Chew every 4 hours PRN Gas  diphenhydrAMINE   Capsule 25 milliGRAM(s) Oral every 6 hours PRN Itching  glycerin Suppository - Adult 1 Suppository(s) Rectal at bedtime PRN Constipation  docusate sodium 100 milliGRAM(s) Oral two times a day PRN Stool Softening  lanolin Ointment 1 Application(s) Topical every 3 hours PRN Sore Nipples  sodium chloride 0.65% Nasal 1 Spray(s) Both Nostrils three times a day PRN Nasal Congestion

## 2017-07-13 ENCOUNTER — APPOINTMENT (OUTPATIENT)
Dept: ANTEPARTUM | Facility: CLINIC | Age: 27
End: 2017-07-13

## 2018-05-10 ENCOUNTER — RESULT REVIEW (OUTPATIENT)
Age: 28
End: 2018-05-10

## 2021-02-16 NOTE — PATIENT PROFILE OB - AS SC BRADEN MOISTURE
"Shell Gauthier (72 y.o. Male)                       ATTENTION;    CLAUDETTE ANGELO, INITIAL CLINICAL FOR REVIEW, CASE REF [id number]                  REPLY TO UR DEPT, DANNIE CHAMBERLAIN LPN  [phone] OR CALL          Date of Birth Social Security Number Address Home Phone MRN    1948  [facility] [address] [phone] [id number]    Jainism Marital Status          Hindu        Admission Date Admission Type Admitting Provider Attending Provider Department, Room/Bed    2/15/21 Urgent Jr Tom Tubbs MD Pollock, Jr Samuel B, MD [facility] CARDIOVASC UNIT, 2221/1    Discharge Date Discharge Disposition Discharge Destination                       Attending Provider: Jr Tom Tubbs MD    Allergies: No Known Allergies    Isolation: None   Infection: None   Code Status: CPR    Ht: 182.9 cm (72\")   Wt: 66.2 kg (145 lb 14.4 oz)    Admission Cmt: None   Principal Problem: None                Active Insurance as of 2/15/2021     Primary Coverage     Payor Plan Insurance Group Employer/Plan Group    AETNA MEDICARE REPLACEMENT AETNA MEDICARE REPLACEMENT [id number]     Payor Plan Address Payor Plan Phone Number Payor Plan Fax Number Effective Dates    [address] [phone]  1/1/2018 - None Entered    [facility] [id number]       Subscriber Name Subscriber Birth Date Member ID       SHELL GAUTHIER 1948 [id number]                 Emergency Contacts      (Rel.) Home Phone Work Phone Mobile Phone    ZEV GAUTHIER (Spouse) [phone] -- --               History & Physical      Jr Tom Tubbs MD at 02/15/21 1402          H&P reviewed. The patient was examined and there are no changes to the H&P.          Electronically signed by Jr Tom Tubbs MD at 02/15/21 1402   Source Note          2/15/2021      Subjective:      Alex Buckley MD    Chief Complaint: dyspnea     History of Present Illness:       Dear  " Alex Buckley MD and Colleagues,  It was nice to see Cosmo Gauthier in consultation at your request. He is a 72 y.o. male with a history of diabetes type 2, hypothyroidism, hx of CAD s/p stents, hyperlipidemia, obesity who has developed progressively worsening dyspnea on exertion. He denies chest pain, nausea/vomiting, and palpitations.  The Cardiac Cath that Dr. Tubbs reviewed revealed severe 2 vessel CAD, 100% LAD occlusion and 100% RCA occlusion with a preserved LV function, EF 50-60% per  Transthoracic echocardiogram.    Patient Active Problem List   Diagnosis   • CAD (coronary artery disease)   • Morbidly obese (CMS/[facility])     PMH: CAD, s/p STENTs, DM type 2, hyperlipidemia, hypertension, mobility issues related to chronic back pain    PSxH: left total knee, left shoulder surgery     No current outpatient medications on file.    Social History     Socioeconomic History   • Marital status:      Spouse name: Not on file   • Number of children: Not on file   • Years of education: Not on file   • Highest education level: Not on file       No family history on file.        Review of Systems:  Review of Systems   Constitutional: Positive for activity change and fatigue.   HENT: Negative.    Eyes: Negative.    Respiratory: Negative.    Gastrointestinal: Negative.    Endocrine: Negative.    Genitourinary: Negative.    Musculoskeletal: Positive for arthralgias and back pain.   Skin: Negative.    Allergic/Immunologic: Negative.    Neurological: Positive for weakness.   Psychiatric/Behavioral: Negative.      Cardiovascular ROS: positive for - dyspnea on exertion  Physical Exam:    Vital Signs:  Weight: 118 kg (260 lb)   Body mass index is 35.26 kg/m².  Temp: 97.6 °F (36.4 °C)   Heart Rate: 91         Constitutional:       Appearance: Not in distress. Ill-appearing.   Eyes:      Pupils: Pupils are equal, round, and reactive to light.   Pulmonary:      Effort: Pulmonary effort is normal.      Breath sounds: Normal  breath sounds.   Cardiovascular:      PMI at left midclavicular line. Normal rate. Regular rhythm.      Murmurs: There is no murmur.   Pulses:     Intact distal pulses.   Abdominal:      General: Bowel sounds are normal. There is no distension.   Musculoskeletal: Normal range of motion.   Skin:     General: Skin is warm.      Coloration: Skin is pale.   Neurological:      Mental Status: Alert and oriented to person, place and time.          Assessment:       Severe 2- vessel CAD   Dyspnea on exertion   Obesity  Decreased mobility   Hypothyroidism  Hypertension  Hyperlipidemia         Recommendation/Plan:       Dr. Tubbs reviewed films and recommends cardiac surgery revascularization  Will admit to inpatient today and obtain preoperative studies  Discussed with Dr. Tubbs, patient and son as well as wife on the phone       Thank you for allowing me to participate in his care.    Regards,    MAXWELL Nj      Electronically signed by Jazmine French APRN at 02/15/21 1142                    Vital Signs (last day)     Date/Time   Temp   Temp src   Pulse   Resp   BP   Patient Position   SpO2    02/16/21 0709   98.2 (36.8)   Oral   85   16   126/83   Lying   93    02/15/21 2306   97.8 (36.6)   Oral   95   17   130/84   Lying   92    02/15/21 2140   --   --   95   18   --   --   99    02/15/21 2138   --   --   97   18   --   --   98    02/15/21 2106   97.6 (36.4)   Oral   96   17   131/85   Lying   93    02/15/21 1745   98.2 (36.8)   Oral   75   16   105/82   Sitting   93    02/15/21 1404   --   Oral   84   22   --   --   --              Oxygen Therapy (last day)     Date/Time   SpO2   Device (Oxygen Therapy)   Flow (L/min)   Oxygen Concentration (%)   ETCO2 (mmHg)    02/16/21 0709   93   room air   --   --   --    02/15/21 2306   92   room air   --   --   --    02/15/21 2140   99   room air   --   --   --    02/15/21 2138   98   room air   --   --   --    02/15/21 2106   93   room air   --   --   --     02/15/21 2055   --   room air   --   --   --    02/15/21 1745   93   room air   --   --   --                Facility-Administered Medications as of 2/16/2021   Medication Dose Route Frequency Provider Last Rate Last Admin   • acetaminophen (TYLENOL) tablet 650 mg  650 mg Oral Q4H PRN Jazmine French APRN       • [COMPLETED] albuterol (PROVENTIL) nebulizer solution 0.5% 2.5 mg/0.5mL  10 mg Nebulization Once Miya Landis MD   10 mg at 02/15/21 2138   • ALPRAZolam (XANAX) tablet 0.25 mg  0.25 mg Oral Q8H PRN Jazmine French APRN       • aspirin EC tablet 81 mg  81 mg Oral Daily Jr Tom Tubbs MD   81 mg at 02/16/21 0834   • atorvastatin (LIPITOR) tablet 20 mg  20 mg Oral Daily Jr Tom Tubbs MD   20 mg at 02/16/21 0834   • [COMPLETED] bumetanide (BUMEX) injection 4 mg  4 mg Intravenous Once Miya Landis MD   4 mg at 02/15/21 1900   • [COMPLETED] calcium gluconate 1g/50ml 0.675% NaCl IV SOLN  1 g Intravenous Once Miya Landis MD   1 g at 02/15/21 1911   • ceFAZolin in dextrose (ANCEF) IVPB solution 2 g  2 g Intravenous On Call to OR Jazmine French APRN       • chlorhexidine (PERIDEX) 0.12 % solution 15 mL  15 mL Mouth/Throat Q12H Jazmine French APRN   15 mL at 02/15/21 2109   • Chlorhexidine Gluconate Cloth 2 % pads 1 application  1 application Topical Q12H Jazmine French APRN   1 application at 02/15/21 2111   • dextrose (D50W) 25 g/ 50mL Intravenous Solution 25 g  25 g Intravenous Q15 Min PRN Jr Tom Tubbs MD       • [COMPLETED] dextrose (D50W) 25 g/ 50mL Intravenous Solution 50 mL  50 mL Intravenous Once Miya Landis MD   50 mL at 02/15/21 1856    And   • [COMPLETED] insulin regular (humuLIN R,novoLIN R) injection 10 Units  10 Units Intravenous Once Miya Landis MD   10 Units at 02/15/21 1856   • dextrose (GLUTOSE) oral gel 15 g  15 g Oral Q15 Min PRN Jr Tom Tubbs MD       • donepezil (ARICEPT) tablet 10 mg  10 mg Oral Daily Jr Arley  Tom BOND MD   10 mg at 02/16/21 0834   • gabapentin (NEURONTIN) capsule 300 mg  300 mg Oral Nightly Jr Tom Tubbs MD   300 mg at 02/15/21 2110   • glucagon (human recombinant) (GLUCAGEN DIAGNOSTIC) injection 1 mg  1 mg Subcutaneous Q15 Min PRN Jr Tom Tubbs MD       • insulin regular (humuLIN R,novoLIN R) injection 0-24 Units  0-24 Units Subcutaneous Q6H Jr Tom Tubbs MD       • levothyroxine (SYNTHROID, LEVOTHROID) tablet 200 mcg  200 mcg Oral QAM Jr Tom Tubbs MD   200 mcg at 02/16/21 0527   • metoprolol tartrate (LOPRESSOR) tablet 12.5 mg  12.5 mg Oral On Call to OR Jazmine French APRN       • mupirocin (BACTROBAN) 2 % nasal ointment 1 application  1 application Each Nare Q12H Jazmine French APRN   1 application at 02/15/21 2110   • nitroglycerin (NITROSTAT) SL tablet 0.4 mg  0.4 mg Sublingual Q5 Min PRN Jazmine Frenhc APRN       • predniSONE (DELTASONE) tablet 20 mg  20 mg Oral Daily Jr Tom Tubbs MD   20 mg at 02/15/21 2109   • sertraline (ZOLOFT) tablet 100 mg  100 mg Oral Daily Jr Tom Tubbs MD   100 mg at 02/16/21 0834   • [COMPLETED] sodium bicarbonate injection 8.4% 50 mEq  50 mEq Intravenous Once Miya Landis MD   50 mEq at 02/15/21 1910   • sodium chloride 0.9 % flush 10 mL  10 mL Intravenous PRN Jazmine French APRN       • sodium chloride 0.9 % flush 3 mL  3 mL Intravenous Q12H Jazmine French APRN   3 mL at 02/15/21 2110   • [COMPLETED] sodium polystyrene (KAYEXALATE) 15 GM/60ML suspension 30 g  30 g Oral Once Miya Landis MD   30 g at 02/15/21 1915   • temazepam (RESTORIL) capsule 7.5 mg  7.5 mg Oral Nightly PRN Jazmine French, APRN   7.5 mg at 02/15/21 2304     Orders (last 24 hrs)      Start     Ordered    02/16/21 0815  Diet Regular; Cardiac, Consistent Carbohydrate  Diet Effective Now      02/16/21 0814    02/16/21 0600  ceFAZolin in dextrose (ANCEF) IVPB solution 2 g  On Call to O.R.      02/15/21 1338    02/16/21 0600   metoprolol tartrate (LOPRESSOR) tablet 12.5 mg  On Call to O.R.      02/15/21 1338    02/16/21 0600  levothyroxine (SYNTHROID, LEVOTHROID) tablet 200 mcg  Every Morning      02/15/21 1545    02/16/21 0600  Platelet Function ADP  Morning Draw      02/15/21 1813    02/16/21 0600  Flow Cytometry, Blood  Morning Draw      02/15/21 2219 02/16/21 0600  BCR-ABL FISH  Morning Draw,   Status:  Canceled      02/15/21 2219 02/16/21 0600  CBC & Differential  Morning Draw      02/15/21 2219 02/16/21 0600  Lactate Dehydrogenase  Morning Draw      02/15/21 2219 02/16/21 0600  Comprehensive Metabolic Panel  Morning Draw      02/15/21 2219 02/16/21 0600  Immature Platelet Fraction  Morning Draw      02/15/21 2219 02/16/21 0600  Folate  Morning Draw      02/15/21 2219 02/16/21 0600  CBC Auto Differential  PROCEDURE ONCE      02/16/21 0002    02/16/21 0528  POC Glucose Once  Once      02/16/21 0526    02/16/21 0407  Manual Differential  Once      02/16/21 0406    02/16/21 0406  Manual Differential  Once,   Status:  Canceled      02/16/21 0405    02/16/21 0405  BCR-ABL FISH  Once      02/16/21 0405    02/16/21 0001  NPO Diet NPO Except: Sips with meds  Diet Effective Midnight,   Status:  Canceled      02/15/21 1338    02/16/21 0000  Clip Hair Chin to Ankles  Once      02/15/21 1338    02/15/21 2257  POC Glucose Once  Once      02/15/21 2255    02/15/21 2143  Haptoglobin  Once      02/15/21 2125    02/15/21 2115  predniSONE (DELTASONE) tablet 20 mg  Daily     Note to Pharmacy: Will need a dose tonight, then daily    02/15/21 2020    02/15/21 2101  Folate RBC  STAT      02/1948    02/15/21 2100  Weigh Patient Night Before Surgery  Once     Comments: Need Actual Weight, Not Stated Weight    02/15/21 1338    02/15/21 2100  amitriptyline (ELAVIL) tablet 50 mg  Nightly,   Status:  Discontinued      02/15/21 1545    02/15/21 2100  gabapentin (NEURONTIN) capsule 300 mg  Nightly      02/15/21 1545    02/15/21 2030   Basic Metabolic Panel  STAT,   Status:  Canceled      02/15/21 1922    02/15/21 2030  Basic Metabolic Panel  STAT,   Status:  Canceled      02/15/21 2030    02/15/21 2029  Comprehensive Metabolic Panel  STAT      02/15/21 2028    02/15/21 2028  Diet Regular; Cardiac, Consistent Carbohydrate, Low Potassium  Diet Effective Now,   Status:  Canceled      02/15/21 2027    02/15/21 1948  Ferritin  STAT      02/1948    02/1948  Vitamin B12  STAT      02/1948    02/1948  Erythropoietin  STAT      02/1947    02/1947  Immature Platelet Fraction  STAT      02/1947    02/1947  Flow Cytometry, Blood  STAT,   Status:  Canceled      02/1947    02/15/21 1946  Iron  STAT,   Status:  Canceled      02/1947    02/15/21 1946  Iron Profile  STAT      02/1947    02/15/21 1946  Lactate Dehydrogenase  STAT      02/1947    02/15/21 1946  Methylmalonic Acid, Serum  STAT      02/1947    02/15/21 1915  bumetanide (BUMEX) injection 4 mg  Once      02/15/21 1829    02/15/21 1915  dextrose (D50W) 25 g/ 50mL Intravenous Solution 50 mL  Once      02/15/21 1829    02/15/21 1915  insulin regular (humuLIN R,novoLIN R) injection 10 Units  Once      02/15/21 1829    02/15/21 1915  sodium bicarbonate injection 8.4% 50 mEq  Once      02/15/21 1829    02/15/21 1915  calcium gluconate 1g/50ml 0.675% NaCl IV SOLN  Once      02/15/21 1829    02/15/21 1915  albuterol (PROVENTIL) nebulizer solution 0.5% 2.5 mg/0.5mL  Once      02/15/21 1829    02/15/21 1915  sodium polystyrene (KAYEXALATE) 15 GM/60ML suspension 30 g  Once      02/15/21 1829    02/15/21 1900  Vital Signs Every Hour and Per Hospital Policy Based on Patient Condition  Every Hour      02/15/21 1829    02/15/21 1834  Hematology & Oncology Inpatient Consult  Once     Specialty:  Hematology and Oncology  Provider:  Kwame Wheeler MD    02/15/21 1833    02/15/21 1832  Blood Gas, Arterial -  STAT      02/15/21 1831     02/15/21 1831  Lactate Dehydrogenase  Once      02/15/21 1830    02/15/21 1831  Lactic Acid, Plasma  Once      02/15/21 1830    02/15/21 1830  Cardiac Monitoring  Continuous      02/15/21 1829    02/15/21 1830  Continuous Pulse Oximetry  Continuous      02/15/21 1829    02/15/21 1830  ECG 12 Lead  STAT,   Status:  Canceled      02/15/21 1829    02/15/21 1829  Insert Indwelling Urinary Catheter  Once      02/15/21 1829    02/15/21 1829  Assess Need for Indwelling Urinary Catheter - Follow Removal Protocol  Continuous     Comments: Indwelling Urinary Catheter Removal Criteria  Discontinue Indwelling Urinary Catheter Unless One of the Following is Present:  Urinary Retention or Obstruction  Chronic Urinary Catheter Use  End of Life  Critical Illness with Strict I/O   Tract or Abdominal Surgery  Stage 3/4 Sacral / Perineal Wound  Required Activity Restriction: Trauma  Required Activity Restriction: Spine Surgery  If Patient is Being Followed by Urology Contact Them PRIOR to Removal  Do Not Remove Indwelling Urinary Catheter Order is Present with a CLINICAL REASON to Maintain the Catheter. Provider is Required to Include a Clinical Reason to Maintain a Urinary Catheter    Patient Admitted With Indwelling Urinary Catheter (Not Placed at [facility] Facility)  Assess for Continued Need & Document Medical Necessity  If Infection is Suspected, Contact the Provider        02/15/21 1829    02/15/21 1829  Urinary Catheter Care  Every Shift      02/15/21 1829    02/15/21 1812  Inpatient Nephrology Consult  Once     Specialty:  Nephrology  Provider:  Edgar Mccarthy MD    02/15/21 1813    02/15/21 1805  ECG 12 Lead  Once      02/15/21 1805    02/15/21 1800  mupirocin (BACTROBAN) 2 % nasal ointment 1 application  Every 12 Hours Scheduled      02/15/21 1338    02/15/21 1800  Chlorhexidine Gluconate Cloth 2 % pads 1 application  Every 12 Hours      02/15/21 1338    02/15/21 1800  chlorhexidine (PERIDEX) 0.12 % solution 15 mL   Every 12 Hours Scheduled      02/15/21 1338    02/15/21 1800  insulin regular (humuLIN R,novoLIN R) injection 0-24 Units  Every 6 Hours Scheduled      02/15/21 1548    02/15/21 1750  Manual Differential  Once      02/15/21 1749    02/15/21 1728  Blood Gas, Arterial -  Once      02/15/21 1724    02/15/21 1717  Manual Differential  Once,   Status:  Canceled      02/15/21 1716    02/15/21 1700  donepezil (ARICEPT) tablet 10 mg  Daily      02/15/21 1545    02/15/21 1700  sertraline (ZOLOFT) tablet 100 mg  Daily      02/15/21 1545    02/15/21 1700  POC Glucose 4x Daily AC & at Bedtime  4 Times Daily Before Meals & at Bedtime     Comments: If bedtime blood glucose is greater than 350 mg/dl, call MD.      02/15/21 1548    02/15/21 1645  atorvastatin (LIPITOR) tablet 20 mg  Daily      02/15/21 1545    02/15/21 1645  aspirin EC tablet 81 mg  Daily      02/15/21 1546    02/15/21 1611  Basic Metabolic Panel  STAT      02/15/21 1611    02/15/21 1608  Diet Regular; Cardiac, Consistent Carbohydrate  Diet Effective Now,   Status:  Canceled      02/15/21 1607    02/15/21 1600  Strict Intake & Output  Every 8 Hours      02/15/21 1338    02/15/21 1600  Neurovascular Checks  Every 4 Hours      02/15/21 1338    02/15/21 1549  ABO RH Specimen Verification  Once      02/15/21 1548    02/15/21 1549  Do NOT Hold Basal or Correction Scale Insulin When Patient is NPO, Hold Scheduled Mealtime (Bolus) Insulin if NPO  Continuous      02/15/21 1548    02/15/21 1549  Follow [facility] Hypoglycemia Standing Orders For Blood Glucose Less Than 70 mg/dL  Until Discontinued     Comments: ALERT PATIENT - NOT NPO & CAN SAFELY SWALLOW  Administer 4 oz Fruit Juice OR 4 oz Regular Soda OR 8 oz Milk OR 15-30 grams (1 tube) of Glucose Gel.  Recheck Blood Glucose Approximately 15 Minutes After Ingestion, Repeat Treatment & Continue to Recheck Blood Sugar Approximately Every 15 Minutes Until Blood Glucose is 70 or Higher.  Once Blood Glucose is 70 or Higher & if It  Will Be More Than 60 Minutes Until Next Meal, Provide Appropriate Snack (Including Carbohydrate Food) Based on Meal Plan Order. Give Meal Tray As Soon As Possible.    PATIENT HAS IV ACCESS - UNRESPONSIVE, NPO OR UNABLE TO SAFELY SWALLOW  Administer 25g (50ml) D50W IV Push.  Recheck Blood Glucose Approximately 15 Minutes After Administration, if Blood Glucose Remains Less Than 70, Repeat Treatment   Recheck Blood Glucose Approximately 15 Minutes After 2nd Administration, if Blood Glucose Remains Less Than 70 After 2nd Dose of D50W, Contact Provider for Further Treatment Orders & Consider Adding IVF With D5W for Maintenance    PATIENT WITHOUT IV ACCESS - UNRESPONSIVE, NPO OR UNABLE TO SAFELY SWALLOW  Administer 1mg Glucagon SQ & Establish IV Access.  Turn Patient on Side - Nausea / Vomiting May Occur.  Recheck Blood Glucose Approximately 15 Minutes After Administration.  If Blood Glucose Remains Less Than 70, Administer 25g D50W IV Push (50ml).  Recheck Blood Glucose Approximately 15 Minutes After Administration of D50W, if Blood Glucose Remains Less Than 70, Contact Provider for Further Treatment Orders & Consider Adding IVF With D5 for Maintenance    Document Event & Patient Response to Interventions in EMR, Document Medications on MAR  Notify Provider if Hypoglycemia Treatment Needed    02/15/21 1548    02/15/21 1548  dextrose (GLUTOSE) oral gel 15 g  Every 15 Minutes PRN      02/15/21 1548    02/15/21 1548  dextrose (D50W) 25 g/ 50mL Intravenous Solution 25 g  Every 15 Minutes PRN      02/15/21 1548    02/15/21 1548  glucagon (human recombinant) (GLUCAGEN DIAGNOSTIC) injection 1 mg  Every 15 Minutes PRN      02/15/21 1548    02/15/21 1522  CBC Auto Differential  PROCEDURE ONCE      02/15/21 1339    02/15/21 1521  Platelet Function ADP  Once      02/15/21 1338    02/15/21 1506  Hemoglobin A1c  Once      02/15/21 1338    02/15/21 1430  sodium chloride 0.9 % flush 3 mL  Every 12 Hours Scheduled      02/15/21 1338     02/15/21 1405  PT Consult: Eval & Treat As Tolerated; Functional Mobility Below Baseline  Once      02/15/21 1404    02/15/21 1400  Instruct Patient on Coughing, Deep Breathing and Incentive Spirometry  Every 4 Hours While Awake      02/15/21 1338    02/15/21 1400  Instruct Patient on Coughing, Deep Breathing and Incentive Spirometry  Every 4 Hours While Awake      02/15/21 1338    02/15/21 1359  POC Glucose Once  Once      02/15/21 1338    02/15/21 1348  Code Status and Medical Interventions:  Continuous      02/15/21 1347    02/15/21 1348  Place Sequential Compression Device  Once      02/15/21 1347    02/15/21 1348  Maintain Sequential Compression Device  Continuous      02/15/21 1347    02/15/21 1347  Inpatient Admission  Once      02/15/21 1346    02/15/21 1347  Cardiac Monitoring  Continuous,   Status:  Canceled      02/15/21 1346    02/15/21 1339  Insert Peripheral IV  Once      02/15/21 1338    02/15/21 1339  Saline Lock & Maintain IV Access  Continuous      02/15/21 1338    02/15/21 1339  Follow Anesthesia Guidelines / Standing Orders  Continuous      02/15/21 1338    02/15/21 1339  Measure Height  Once      02/15/21 1338    02/15/21 1339  Skin Assessment  Every Shift      02/15/21 1338    02/15/21 1339  POC Glucose Once  Once     Comments: Obtain glucose at 0400 the day of surgery, if greater than 200, initiate insulin IV protocol      02/15/21 1338    02/15/21 1339  Give Patient Pre-Op Education Booklet / Materials  Once     Comments: Give Patient Pre-Op Education Booklet / Materials    02/15/21 1338    02/15/21 1339  Obtain Informed Consent  Once      02/15/21 1338    02/15/21 1339  Evaluation For Pre-Op PFT Need  Once      02/15/21 1338    02/15/21 1339  Notify Surgeon if Patient Currently Taking Metformin, Warfarin, Plavix, Effient, Ticlid, Brillinta, Pradaxa, Xarelto, Eliquis, Savaysa, Coumadin, Pletal, or  Any Other Antiplatelet / Anticoagulant  Once      02/15/21 1338    02/15/21 1339  RN To Place  Hospitalist & Diabetes Educator Consult Orders if Patient Diabetic, POC Glucose is Greater Than 180 or HgbA1c Greater Than 7  Continuous     Comments: Hospitalist Reason for Consult - Blood Glucose Management  Diabetes Educator Reason for Consult - Diabetes Education    02/15/21 1338    02/15/21 1339  Inpatient Anesthesiology Consult  Once     Specialty:  Anesthesiology  Provider:  Filemon Meadows MD    02/15/21 1338    02/15/21 1339  CBC & Differential  Once      02/15/21 1338    02/15/21 1339  Comprehensive Metabolic Panel  Once      02/15/21 1338    02/15/21 1339  Magnesium  Once      02/15/21 1338    02/15/21 1339  BNP  Once      02/15/21 1338    02/15/21 1339  Lipid Panel  Once      02/15/21 1338    02/15/21 1339  aPTT  Once      02/15/21 1338    02/15/21 1339  Protime-INR  Once      02/15/21 1338    02/15/21 1339  Urinalysis With Culture If Indicated - Urine, Clean Catch  Once      02/15/21 1338    02/15/21 1339  Blood Gas, Arterial -  Once      02/15/21 1338    02/15/21 1339  Type & Screen  Once      02/15/21 1338    02/15/21 1339  Prepare RBC, 2 Units  Blood - Once      02/15/21 1338    02/15/21 1339  Prepare Platelet Pheresis, 2 Units  Blood - Once      02/15/21 1338    02/15/21 1339  XR Chest PA & Lateral  1 Time Imaging      02/15/21 1338    02/15/21 1339  Carotid Duplex - Bilateral  Once      02/15/21 1338    02/15/21 1339  Duplex Vein Mapping Lower Extremity - Bilateral CAR  Once      02/15/21 1338    02/15/21 1339  COVID PRE-OP / PRE-PROCEDURE SCREENING ORDER (NO ISOLATION) - Swab, Nasopharynx  Once      02/15/21 1338    02/15/21 1339  COVID-19,[facility] GINO IN-HOUSE CEPHEID, NP SWAB IN TRANSPORT MEDIA 8-12 HR TAT - Swab, Nasopharynx  PROCEDURE ONCE      02/15/21 1339    02/15/21 1338  temazepam (RESTORIL) capsule 7.5 mg  Nightly PRN      02/15/21 1338    02/15/21 1338  sodium chloride 0.9 % flush 10 mL  As Needed      02/15/21 1338    02/15/21 1338  ALPRAZolam (XANAX) tablet 0.25 mg  Every 8 Hours PRN       02/15/21 1338    02/15/21 1338  acetaminophen (TYLENOL) tablet 650 mg  Every 4 Hours PRN      02/15/21 1338    02/15/21 1338  nitroglycerin (NITROSTAT) SL tablet 0.4 mg  Every 5 Minutes PRN      02/15/21 1338    Unscheduled  Chlorhexidine Skin Prep - Chin to Toes 12 Hours Prior to Surgery & Morning of Surgery  As Needed     Comments: Chlorhexidine Skin wipes and instructions for all patients having a procedure requiring an outward incision if not allergic.  If allergic, give antibacterial skin wipes and instructions.  Do not use for facial cases or on any mucus membranes.    12 Hours Prior to Surgery & The Morning of Surgery    02/15/21 1338    --  metFORMIN (GLUCOPHAGE) 1000 MG tablet  2 Times Daily With Meals      02/15/21 1521    --  simvastatin (ZOCOR) 40 MG tablet  Nightly      02/15/21 1521    --  amitriptyline (ELAVIL) 50 MG tablet  Nightly      02/15/21 1521    --  clopidogrel (PLAVIX) 75 MG tablet  Daily,   Status:  Discontinued      02/15/21 1521    --  pantoprazole (PROTONIX) 40 MG EC tablet  Daily      02/15/21 1521    --  glipizide (GLUCOTROL) 10 MG tablet  2 Times Daily Before Meals      02/15/21 1521    --  ferrous sulfate 325 (65 FE) MG tablet  3 Times Daily      02/15/21 1521    --  gabapentin (NEURONTIN) 300 MG capsule  Nightly      02/15/21 1521    --  vitamin B-12 (CYANOCOBALAMIN) 1000 MCG tablet  Nightly      02/15/21 1529    --  vitamin D3 125 MCG (5000 UT) capsule capsule  Daily      02/15/21 1529    --  levothyroxine (SYNTHROID, LEVOTHROID) 200 MCG tablet  Every Morning      02/15/21 1529    --  lisinopril (PRINIVIL,ZESTRIL) 5 MG tablet  Daily      02/15/21 1529    --  nadolol (CORGARD) 40 MG tablet  Daily      02/15/21 1529    --  isosorbide mononitrate (IMDUR) 60 MG 24 hr tablet  Daily      02/15/21 1529    --  sertraline (ZOLOFT) 100 MG tablet  Daily      02/15/21 1529    --  predniSONE (DELTASONE) 5 MG tablet  Daily      02/15/21 1529    --  insulin glargine (LANTUS, SEMGLEE) 100 UNIT/ML  "injection  Nightly      02/15/21 1529    --  insulin lispro (humaLOG, ADMELOG) 100 UNIT/ML injection  3 Times Daily Before Meals      02/15/21 1529    --  DONEPEZIL HCL PO  Daily      02/15/21 1529                   Physician Progress Notes       Jazmine French APRN at 02/16/21 0759           LOS: 1 day   Patient Care Team:  Alex Buckley MD as PCP - General (Internal Medicine)    Chief Complaint:   Dyspnea on exertion    Subjective:  Symptoms:  No shortness of breath or chest pain.    Diet:  No nausea.    Activity level: Impaired due to weakness.      \"I feel good this morning\"    Vital Signs  Temp:  [97.6 °F (36.4 °C)-98.2 °F (36.8 °C)] 98.2 °F (36.8 °C)  Heart Rate:  [75-97] 85  Resp:  [16-22] 16  BP: (105-131)/(82-85) 126/83      02/15/21  1404 02/15/21  2100   Weight: 115 kg (252 lb 8 oz) 66.2 kg (145 lb 14.4 oz)     Body mass index is 19.79 kg/m².    Intake/Output Summary (Last 24 hours) at 2/16/2021 0759  Last data filed at 2/16/2021 0410  Gross per 24 hour   Intake 480 ml   Output 2990 ml   Net -2510 ml     No intake/output data recorded.          Objective:  General Appearance:  In no acute distress and comfortable.    Vital signs: (most recent): Blood pressure 126/83, pulse 85, temperature 98.2 °F (36.8 °C), temperature source Oral, resp. rate 16, height 182.9 cm (72\"), weight 66.2 kg (145 lb 14.4 oz), SpO2 93 %.  Vital signs are normal.    Output: Producing urine and producing stool.    Lungs:  Normal effort and normal respiratory rate.  He is not in respiratory distress.  No wheezes.    Heart: Normal rate.  Regular rhythm.    Abdomen: Bowel sounds are normal.   There is no abdominal tenderness.     Pulses: Distal pulses are intact.    Neurological: Patient is alert and oriented to person, place and time.    Skin:  Warm and pale.              Physical Exam:   General Appearance: awake and alert, no acute distress   Lungs: clear to auscultation, respirations regular, respirations even and respirations " unlabored   Heart: regular rhythm & normal rate, normal S1, S2, no murmur, no gallop, no rub and no click   Abdomen: bowel sounds present and normal in all 4 quadrants,     Neuro: alert and oriented, no focal deficits.     Results Review:        WBC WBC   Date Value Ref Range Status   02/16/2021 15.76 (H) 3.40 - 10.80 10*3/mm3 Final   02/15/2021 16.49 (H) 3.40 - 10.80 10*3/mm3 Final      HGB Hemoglobin   Date Value Ref Range Status   02/16/2021 12.7 (L) 13.0 - 17.7 g/dL Final   02/15/2021 12.9 (L) 13.0 - 17.7 g/dL Final      HCT Hematocrit   Date Value Ref Range Status   02/16/2021 38.1 37.5 - 51.0 % Final   02/15/2021 38.1 37.5 - 51.0 % Final      Platelets Platelets   Date Value Ref Range Status   02/16/2021 65 (L) 140 - 450 10*3/mm3 Final   02/16/2021 65 (L) 140 - 450 10*3/mm3 Final   02/15/2021 59 (L) 140 - 450 10*3/mm3 Final   02/15/2021 69 (L) 140 - 450 10*3/mm3 Final        PT/INR:    Protime   Date Value Ref Range Status   02/15/2021 12.7 11.7 - 14.2 Seconds Final   /  INR   Date Value Ref Range Status   02/15/2021 0.97 0.90 - 1.10 Final       Sodium Sodium   Date Value Ref Range Status   02/16/2021 135 (L) 136 - 145 mmol/L Final   02/15/2021 136 136 - 145 mmol/L Final   02/15/2021 136 136 - 145 mmol/L Final   02/15/2021 134 (L) 136 - 145 mmol/L Final      Potassium Potassium   Date Value Ref Range Status   02/16/2021 5.0 3.5 - 5.2 mmol/L Final   02/15/2021 5.0 3.5 - 5.2 mmol/L Final   02/15/2021 7.1 (C) 3.5 - 5.2 mmol/L Final   02/15/2021 6.6 (C) 3.5 - 5.2 mmol/L Final      Chloride Chloride   Date Value Ref Range Status   02/16/2021 100 98 - 107 mmol/L Final   02/15/2021 103 98 - 107 mmol/L Final   02/15/2021 105 98 - 107 mmol/L Final   02/15/2021 102 98 - 107 mmol/L Final      Bicarbonate CO2   Date Value Ref Range Status   02/16/2021 22.8 22.0 - 29.0 mmol/L Final   02/15/2021 22.4 22.0 - 29.0 mmol/L Final   02/15/2021 21.9 (L) 22.0 - 29.0 mmol/L Final   02/15/2021 22.3 22.0 - 29.0 mmol/L Final      BUN  BUN   Date Value Ref Range Status   02/16/2021 41 (H) 8 - 23 mg/dL Final   02/15/2021 46 (H) 8 - 23 mg/dL Final   02/15/2021 43 (H) 8 - 23 mg/dL Final   02/15/2021 43 (H) 8 - 23 mg/dL Final      Creatinine Creatinine   Date Value Ref Range Status   02/16/2021 1.49 (H) 0.76 - 1.27 mg/dL Final   02/15/2021 1.71 (H) 0.76 - 1.27 mg/dL Final   02/15/2021 1.63 (H) 0.76 - 1.27 mg/dL Final   02/15/2021 1.74 (H) 0.76 - 1.27 mg/dL Final      Calcium Calcium   Date Value Ref Range Status   02/16/2021 9.7 8.6 - 10.5 mg/dL Final   02/15/2021 9.8 8.6 - 10.5 mg/dL Final   02/15/2021 9.1 8.6 - 10.5 mg/dL Final   02/15/2021 9.8 8.6 - 10.5 mg/dL Final      Magnesium Magnesium   Date Value Ref Range Status   02/15/2021 1.7 1.6 - 2.4 mg/dL Final          aspirin, 81 mg, Oral, Daily  atorvastatin, 20 mg, Oral, Daily  ceFAZolin, 2 g, Intravenous, On Call to OR  chlorhexidine, 15 mL, Mouth/Throat, Q12H  Chlorhexidine Gluconate Cloth, 1 application, Topical, Q12H  donepezil, 10 mg, Oral, Daily  gabapentin, 300 mg, Oral, Nightly  insulin regular, 0-24 Units, Subcutaneous, Q6H  levothyroxine, 200 mcg, Oral, QAM  metoprolol tartrate, 12.5 mg, Oral, On Call to OR  mupirocin, 1 application, Each Nare, Q12H  predniSONE, 20 mg, Oral, Daily  sertraline, 100 mg, Oral, Daily  sodium chloride, 3 mL, Intravenous, Q12H               Patient Active Problem List   Diagnosis Code   • CAD (coronary artery disease) I25.10   • Morbidly obese (CMS/[facility]) E66.01   • CAD (coronary artery disease), native coronary artery I25.10   • Lymphoma (CMS/HCC) C85.90   • Diabetes mellitus (CMS/HCC) E11.9   • Dementia (CMS/HCC) F03.90   • Stage 3b chronic kidney disease (CMS/HCC) N18.32   • Thrombocytopenia (CMS/HCC) D69.6   • Disease of thyroid gland E07.9       Assessment & Plan    -Severe 2- vessel CAD   -Dyspnea on exertion   -Obesity  -Diabetes type 2  -Decreased mobility   -Hypothyroidism  -Hypertension  -YI  -hx of lymphoma   -chronic steroid use  -chronic back  pain  -Hyperlipidemia   -CKD stage III-- renal consulted  -Thrombocytopenia-- plt count 65  -leukocytosis    Hematology oncology consulted with history of lymphoma and thrombocytopenia  Potassium 7 yesterday afternoon- renal consulted  No plans for surgery at this time until evaluated by renal and hematology  Discussed with patient     MAXWELL Nj  02/16/21  07:59 EST      Electronically signed by Jazmine French APRN at 02/16/21 0817     Jr Tom Tubbs MD at 02/15/21 1404        I had reviewed this film earlier because of the LAD.  He has multivessel coronary disease with an occluded LAD and occluded right.  The circumflex had a 70 to 80% lesions.  He has shortness of breath as a symptom but he is a severe diabetic.  This is his angina equivalent.  He has no murmurs clicks rubs or gallops.  His pulses are good distally.  He is weak and wobbly per his history.  He saw us in the office today in a wheelchair.    Operation is advisable and will plan CABG in the morning.  Physical therapy should see him preoperatively.  Discussed all this with the patient and his.    Electronically signed by Jr Tom Tubbs MD at 02/15/21 1407          Consult Notes       Miya Landis MD at 02/16/21 0946      Consult Orders    1. Inpatient Nephrology Consult [[id number]] ordered by Jr Tom Tubbs MD at 02/15/21 1810                 Referring Provider: Jr Tom Tubbs MD  Reason for Consultation: MARKUS and hyperkalemia    Subjective     Chief complaint No chief complaint on file.      History of present illness:        72 years old white male with a past medical history of hypertension, diabetes mellitus type 2, history of coronary artery disease status post stenting in the past, obesity and dyslipidemia as well as low-grade lymphoma and history of ITP who was admitted from Dr. Tubbs office for open heart surgery.  Patient was found to have acute kidney injury and severe hyperkalemia so we were  consulted to help with management.  Bedside bladder ultrasound showed urinary retention.  Patient is poor historian and history was taken mainly from the chart and medical staff.  He denies dysuria or urgency.  He denied taking potassium at home or NSAID.            Past Medical History:   Diagnosis Date   • Anxiety and depression    • Arthritis    • Coronary artery disease    • Dementia (CMS/HCC)    • Diabetes mellitus (CMS/HCC)    • Disease of thyroid gland    • Elevated cholesterol    • GERD (gastroesophageal reflux disease)    • Hypertension    • Lymphoma (CMS/HCC)     History of lymphoma, has been in remission   • Sleep apnea      Past Surgical History:   Procedure Laterality Date   • HERNIA REPAIR     • JOINT REPLACEMENT       History reviewed. No pertinent family history.  Social History     Tobacco Use   • Smoking status: Never Smoker   • Smokeless tobacco: Never Used   Substance Use Topics   • Alcohol use: Not Currently     Comment: QUIT DRINKING 32 YEARS AGO   • Drug use: Never     Medications Prior to Admission   Medication Sig Dispense Refill Last Dose   • amitriptyline (ELAVIL) 50 MG tablet Take 50 mg by mouth Every Night.      • DONEPEZIL HCL PO Take 10 mg by mouth Daily.      • ferrous sulfate 325 (65 FE) MG tablet Take 325 mg by mouth 3 (Three) Times a Day.      • gabapentin (NEURONTIN) 300 MG capsule Take 300 mg by mouth Every Night.      • glipizide (GLUCOTROL) 10 MG tablet Take 10 mg by mouth 2 (Two) Times a Day Before Meals.      • insulin glargine (LANTUS, SEMGLEE) 100 UNIT/ML injection Inject 30 Units under the skin into the appropriate area as directed Every Night.      • insulin lispro (humaLOG, ADMELOG) 100 UNIT/ML injection Inject 15 Units under the skin into the appropriate area as directed 3 (Three) Times a Day Before Meals.      • isosorbide mononitrate (IMDUR) 60 MG 24 hr tablet Take 60 mg by mouth Daily.      • levothyroxine (SYNTHROID, LEVOTHROID) 200 MCG tablet Take 200 mcg by mouth  "Every Morning.      • lisinopril (PRINIVIL,ZESTRIL) 5 MG tablet Take 10 mg by mouth Daily. Take 0.5 tablet daily      • metFORMIN (GLUCOPHAGE) 1000 MG tablet Take 1,000 mg by mouth 2 (Two) Times a Day With Meals.      • nadolol (CORGARD) 40 MG tablet Take 40 mg by mouth Daily.      • pantoprazole (PROTONIX) 40 MG EC tablet Take 40 mg by mouth Daily.      • predniSONE (DELTASONE) 5 MG tablet Take 20 mg by mouth Daily.      • sertraline (ZOLOFT) 100 MG tablet Take 100 mg by mouth Daily. Take 1.5 tablets daily      • simvastatin (ZOCOR) 40 MG tablet Take 40 mg by mouth Every Night. Take 0.5 tablets every night      • vitamin B-12 (CYANOCOBALAMIN) 1000 MCG tablet Take 2,000 mcg by mouth Every Night.      • vitamin D3 125 MCG (5000 UT) capsule capsule Take 5,000 Units by mouth Daily.        Allergies:  Patient has no known allergies.    Review of Systems  Pertinent items are noted in HPI.    Objective     Vital Signs  Temp:  [97.6 °F (36.4 °C)-98.2 °F (36.8 °C)] 98.2 °F (36.8 °C)  Heart Rate:  [75-97] 85  Resp:  [16-22] 16  BP: (105-131)/(82-85) 126/83    Flowsheet Rows      First Filed Value   Admission Height  182.9 cm (72\") Documented at 02/15/2021 1404   Admission Weight  115 kg (252 lb 8 oz) Documented at 02/15/2021 1404           No intake/output data recorded.  I/O last 3 completed shifts:  In: 480 [P.O.:480]  Out: 2990 [Urine:2990]    Intake/Output Summary (Last 24 hours) at 2/16/2021 0959  Last data filed at 2/16/2021 0410  Gross per 24 hour   Intake 480 ml   Output 2990 ml   Net -2510 ml       Physical Exam:     General Appearance:    Alert, cooperative, in no acute distress   Head:    Normocephalic, without obvious abnormality, atraumatic   Eyes:            Lids and lashes normal, conjunctivae and sclerae normal, no   icterus, no pallor, corneas clear, PERRLA   Ears:    Ears appear intact with no abnormalities noted   Throat:   No oral lesions, no thrush, oral mucosa moist   Neck:   No adenopathy, supple, " trachea midline, no thyromegaly, no     carotid bruit, no JVD   Back:     No kyphosis present, no scoliosis present, no skin lesions,       erythema or scars, no tenderness to percussion or                   palpation,   range of motion normal   Lungs:     Clear to auscultation,respirations regular, even and                   unlabored    Heart:    Regular rhythm and normal rate, normal S1 and S2, no            murmur, no gallop, no rub, no click       Abdomen:     Normal bowel sounds, no masses, no organomegaly, soft        non-tender, non-distended, no guarding, no rebound                 tenderness       Extremities:   Moves all extremities well, no edema, no cyanosis, no              redness   Pulses:   Pulses palpable and equal bilaterally   Skin:   No bleeding, bruising or rash               Results Review:  Results from last 7 days   Lab Units 02/16/21  0351 02/15/21  2039 02/15/21  1648 02/15/21  1404   SODIUM mmol/L 135* 136 136 134*   POTASSIUM mmol/L 5.0 5.0 7.1* 6.6*   CHLORIDE mmol/L 100 103 105 102   CO2 mmol/L 22.8 22.4 21.9* 22.3   BUN mg/dL 41* 46* 43* 43*   CREATININE mg/dL 1.49* 1.71* 1.63* 1.74*   CALCIUM mg/dL 9.7 9.8 9.1 9.8   BILIRUBIN mg/dL 0.4 0.3  --  0.4   ALK PHOS U/L 84 85  --  94   ALT (SGPT) U/L 17 19  --  22   AST (SGOT) U/L 17 12  --  15   GLUCOSE mg/dL 162* 142* 101* 138*       Estimated Creatinine Clearance: 41.3 mL/min (A) (by C-G formula based on SCr of 1.49 mg/dL (H)).    Results from last 7 days   Lab Units 02/15/21  1404   MAGNESIUM mg/dL 1.7       Results from last 7 days   Lab Units 02/16/21  0351 02/15/21  1648   WBC 10*3/mm3 15.76* 16.49*   HEMOGLOBIN g/dL 12.7* 12.9*   PLATELETS 10*3/mm3 65*  65* 69*  59*       Results from last 7 days   Lab Units 02/15/21  1404   INR  0.97       Active Medications  aspirin, 81 mg, Oral, Daily  atorvastatin, 20 mg, Oral, Daily  ceFAZolin, 2 g, Intravenous, On Call to OR  chlorhexidine, 15 mL, Mouth/Throat, Q12H  Chlorhexidine Gluconate  Cloth, 1 application, Topical, Q12H  donepezil, 10 mg, Oral, Daily  gabapentin, 300 mg, Oral, Nightly  insulin regular, 0-24 Units, Subcutaneous, Q6H  levothyroxine, 200 mcg, Oral, QAM  metoprolol tartrate, 12.5 mg, Oral, On Call to OR  mupirocin, 1 application, Each Nare, Q12H  predniSONE, 20 mg, Oral, Daily  sertraline, 100 mg, Oral, Daily  sodium chloride, 3 mL, Intravenous, Q12H           Assessment/Plan       CAD (coronary artery disease)    Morbidly obese (CMS/HCC)    CAD (coronary artery disease), native coronary artery    Lymphoma (CMS/HCC)    Diabetes mellitus (CMS/HCC)    Dementia (CMS/HCC)    Stage 3b chronic kidney disease (CMS/HCC)    Thrombocytopenia (CMS/HCC)    Disease of thyroid gland      -Acute kidney injury on chronic kidney disease stage III associated with severe hyperkalemia: Etiology is likely related to urinary retention that has improved with Kaur catheter placement as well as side effect of ACE inhibitor.  There is no evidence of hemolysis based on LDH and haptoglobin.    Will keep Kaur catheter till after surgery.  Continue to hold ACE inhibitor and other nephrotoxic medication.  We will give additional dose of diuretic.  Okay to proceed with surgery tomorrow  Cussed with medical staff and oncology    -Severe hyperkalemia with potassium 7.1 on admission and down to 5 today.  Okay to proceed with surgery tomorrow morning   -Chronic kidney disease stage III  -Coronary artery disease plan for surgery tomorrow morning  -History of lymphoma: Oncology on board  -History of ITP  -Early dementia      Miya Landis MD  02/16/21  09:59 EST                Electronically signed by Miya Landis MD at 02/16/21 1009        (4) rarely moist